# Patient Record
(demographics unavailable — no encounter records)

---

## 2018-06-12 NOTE — RAD
PA AND LATERAL CHEST RADIOGRAPH:

 

Date: 6-12-18 

 

History: Shortness of breath for a couple of weeks. 

 

FINDINGS: 

Cardiac silhouette and pulmonary vasculature are within normal limits. Lungs are clear. Minimal degen
erative changes are seen in the spine. 

 

IMPRESSION: 

No acute cardiopulmonary process.

 

POS: [facility]